# Patient Record
Sex: MALE | Race: BLACK OR AFRICAN AMERICAN | Employment: FULL TIME | URBAN - METROPOLITAN AREA
[De-identification: names, ages, dates, MRNs, and addresses within clinical notes are randomized per-mention and may not be internally consistent; named-entity substitution may affect disease eponyms.]

---

## 2018-10-11 ENCOUNTER — HOSPITAL ENCOUNTER (EMERGENCY)
Age: 48
Discharge: HOME | End: 2018-10-11
Attending: EMERGENCY MEDICINE

## 2018-10-11 ENCOUNTER — APPOINTMENT (OUTPATIENT)
Dept: GENERAL RADIOLOGY | Age: 48
End: 2018-10-11

## 2018-10-11 VITALS
HEART RATE: 64 BPM | OXYGEN SATURATION: 100 % | BODY MASS INDEX: 23.59 KG/M2 | RESPIRATION RATE: 16 BRPM | WEIGHT: 178 LBS | TEMPERATURE: 98.1 F | SYSTOLIC BLOOD PRESSURE: 129 MMHG | DIASTOLIC BLOOD PRESSURE: 80 MMHG | HEIGHT: 73 IN

## 2018-10-11 DIAGNOSIS — M25.561 ACUTE PAIN OF RIGHT KNEE: Primary | ICD-10-CM

## 2018-10-11 DIAGNOSIS — M25.461 KNEE EFFUSION, RIGHT: ICD-10-CM

## 2018-10-11 LAB
CRYSTALS, FLUID: NEGATIVE
SPECIMEN TYPE: NORMAL

## 2018-10-11 PROCEDURE — 87205 SMEAR GRAM STAIN: CPT

## 2018-10-11 PROCEDURE — 73562 X-RAY EXAM OF KNEE 3: CPT

## 2018-10-11 PROCEDURE — 99283 EMERGENCY DEPT VISIT LOW MDM: CPT

## 2018-10-11 PROCEDURE — 87075 CULTR BACTERIA EXCEPT BLOOD: CPT

## 2018-10-11 PROCEDURE — 87070 CULTURE OTHR SPECIMN AEROBIC: CPT

## 2018-10-11 PROCEDURE — 89051 BODY FLUID CELL COUNT: CPT

## 2018-10-11 PROCEDURE — 89060 EXAM SYNOVIAL FLUID CRYSTALS: CPT

## 2018-10-11 RX ORDER — IBUPROFEN 800 MG/1
800 TABLET ORAL EVERY 8 HOURS PRN
Qty: 15 TABLET | Refills: 0 | Status: SHIPPED | OUTPATIENT
Start: 2018-10-11 | End: 2022-06-16

## 2018-10-11 ASSESSMENT — PAIN DESCRIPTION - ORIENTATION: ORIENTATION: RIGHT

## 2018-10-11 ASSESSMENT — ENCOUNTER SYMPTOMS: COLOR CHANGE: 0

## 2018-10-11 ASSESSMENT — PAIN SCALES - GENERAL: PAINLEVEL_OUTOF10: 8

## 2018-10-11 ASSESSMENT — PAIN DESCRIPTION - PAIN TYPE: TYPE: ACUTE PAIN

## 2018-10-11 ASSESSMENT — PAIN DESCRIPTION - ONSET: ONSET: ON-GOING

## 2018-10-11 ASSESSMENT — PAIN DESCRIPTION - DESCRIPTORS: DESCRIPTORS: SHARP;STABBING

## 2018-10-11 ASSESSMENT — PAIN DESCRIPTION - LOCATION: LOCATION: KNEE

## 2018-10-11 ASSESSMENT — PAIN DESCRIPTION - PROGRESSION: CLINICAL_PROGRESSION: GRADUALLY WORSENING

## 2018-10-11 ASSESSMENT — PAIN DESCRIPTION - FREQUENCY: FREQUENCY: CONTINUOUS

## 2018-10-11 NOTE — LETTER
[facility] ED  [place] [address]  Phone: [phone]             October 11, 2018    Patient: Dagmar Baires   YOB: 1970   Date of Visit: 10/11/2018       To Whom It May Concern:    Dagmar Baires was seen and treated in our emergency department on 10/11/2018. 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     Sincerely,             Signature:__________________________________

## 2018-10-12 LAB
APPEARANCE FLUID: NORMAL
BASO FLUID: NORMAL %
COLOR FLUID: NORMAL
EOSINOPHIL FLUID: NORMAL %
FLUID DIFF COMMENT: NORMAL
LYMPHOCYTES, BODY FLUID: 49 %
MONOCYTE, FLUID: NORMAL %
NEUTROPHIL, FLUID: 23 %
OTHER CELLS FLUID: NORMAL %
RBC FLUID: 3000 /MM3
SPECIMEN TYPE: NORMAL
WBC FLUID: 59 /MM3

## 2018-10-13 LAB
CULTURE: ABNORMAL
DIRECT EXAM: ABNORMAL
Lab: ABNORMAL
SPECIMEN DESCRIPTION: ABNORMAL
STATUS: ABNORMAL

## 2018-10-15 ENCOUNTER — TELEPHONE (OUTPATIENT)
Dept: ORTHOPEDIC SURGERY | Age: 48
End: 2018-10-15

## 2022-06-16 ENCOUNTER — HOSPITAL ENCOUNTER (EMERGENCY)
Age: 52
Discharge: HOME | End: 2022-06-16
Attending: EMERGENCY MEDICINE
Payer: COMMERCIAL

## 2022-06-16 VITALS
SYSTOLIC BLOOD PRESSURE: 141 MMHG | HEART RATE: 69 BPM | OXYGEN SATURATION: 99 % | WEIGHT: 180 LBS | RESPIRATION RATE: 16 BRPM | HEIGHT: 73 IN | TEMPERATURE: 97.6 F | BODY MASS INDEX: 23.86 KG/M2 | DIASTOLIC BLOOD PRESSURE: 103 MMHG

## 2022-06-16 DIAGNOSIS — S61.213A LACERATION OF LEFT MIDDLE FINGER WITHOUT FOREIGN BODY WITHOUT DAMAGE TO NAIL, INITIAL ENCOUNTER: Primary | ICD-10-CM

## 2022-06-16 PROCEDURE — 90715 TDAP VACCINE 7 YRS/> IM: CPT | Performed by: PHYSICIAN ASSISTANT

## 2022-06-16 PROCEDURE — 99284 EMERGENCY DEPT VISIT MOD MDM: CPT

## 2022-06-16 PROCEDURE — 12002 RPR S/N/AX/GEN/TRNK2.6-7.5CM: CPT

## 2022-06-16 PROCEDURE — 6370000000 HC RX 637 (ALT 250 FOR IP): Performed by: PHYSICIAN ASSISTANT

## 2022-06-16 PROCEDURE — 90471 IMMUNIZATION ADMIN: CPT | Performed by: PHYSICIAN ASSISTANT

## 2022-06-16 PROCEDURE — 6360000002 HC RX W HCPCS: Performed by: PHYSICIAN ASSISTANT

## 2022-06-16 RX ORDER — GINSENG 100 MG
CAPSULE ORAL ONCE
Status: COMPLETED | OUTPATIENT
Start: 2022-06-16 | End: 2022-06-16

## 2022-06-16 RX ADMIN — TETANUS TOXOID, REDUCED DIPHTHERIA TOXOID AND ACELLULAR PERTUSSIS VACCINE, ADSORBED 0.5 ML: 5; 2.5; 8; 8; 2.5 SUSPENSION INTRAMUSCULAR at 14:55

## 2022-06-16 RX ADMIN — BACITRACIN: 500 OINTMENT TOPICAL at 14:56

## 2022-06-16 NOTE — ED PROVIDER NOTES
[id number] [facility] ED  [address] [facility]. [facility] [address]  Phone: [phone]  Fax: Idalia Morris [phone]      Pt Name: Jimena De  MRN: [id number]  [place] 1970  Date of evaluation: 6/16/22      CHIEF COMPLAINT:  Chief Complaint   Patient presents with    Hand Laceration       HISTORY OF PRESENT ILLNESS    Jimena De is a 46 y.o. male who presents for evaluation of a laceration:     Location/Symptom: Left long finger pad laceration  Timing/Onset: 4 hours ago  Context/Setting:   Patient here for evaluation of a work-related laceration of his left long finger pad. Patient was reportedly using a  and drilled across his finger accidentally. 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 No focal weakness/paresthesias. Bleeding controlled with pressure. Tetanus UTD? YES    Any broken fragments/shards of material(s) found near site of incident? NO    Quality: sharp, achy  Duration: constant  Modifying Factors: worse with movement  Severity: moderate    Nursing Notes were reviewed. REVIEW OF SYSTEMS       Constitutional: Denies recent fever, chills. HEENT: No visual changes. Neck: No neck pain. Respiratory: Denies recent shortness of breath. Cardiac:  Denies recent chest pain. GI:  Denies abdominal pain/nausea/vomiting/diarrhea. Musculoskeletal: Denies focal weakness. Neurologic: Denies headache or focal weakness. Skin:  laceration    Negative in 10 essential Systems except as mentioned above and in the HPI. PAST MEDICAL HISTORY   PMH:  has a past medical history of No active medical problems and Sebaceous cyst.  Surgical History:  has a past surgical history that includes Tonsillectomy and other surgical history (Right, 8/28/14).   Social History:  reports that he has been smoking cigarettes. He has never used smokeless tobacco. He reports current alcohol use. Family History: None  Psychiatric History: None    Allergies:has No Known Allergies. PHYSICAL EXAM     INITIAL VITALS: BP (!) 141/103   Pulse 69   Temp 97.6 °F (36.4 °C) (Oral)   Resp 16   Ht 6' 1\" (1.854 m)   Wt 81.6 kg (180 lb)   SpO2 99%   BMI 23.75 kg/m²   Constitutional:  Well developed   Eyes:  Pupils equal/round  HENT:  Atraumatic scalp/face, external ears normal, nose normal  Respiratory:  Clear to auscultation bilaterally with good air exchange, no W/R/R  Cardiovascular:  RRR with normal S1 and S2  Musculoskeletal:   Approximately 2.5-3 cm vertical laceration across the left long finger pad. There is no involvement of the DIP joint or the nail. Full range of motion's of this finger all joints, DIP/PIP and MCP. There is no focal weakness or any tenderness deficits appreciated. Wound is clean, no active bleeding or scant bleeding with removal of dressing. No ischemic changes. NV Intact distally  Integument:  As above  Neurologic:  Alert, appropriate interaction/mentation, no focal deficits noted       DIAGNOSTIC RESULTS     EKG: All EKG's are interpreted by the Emergency Department Physician who either signs or Co-signs this chart in the absence of a cardiologist.  Not indicated      RADIOLOGY:   Reviewed the radiologist:  No orders to display     Not indicated      LABS:  Labs Reviewed - No data to display  Not indicated    EMERGENCY DEPARTMENT COURSE:       Discussed risk of tendinous injury not found on physical exam and during my thorough cleaning/exploration of wound. Pt/family was told to get re-evaluated if they experience loss of motion of their injured finger/toe. This may be associated with a new injury/\"popping\" sound or sensation, or with normal daily activities/lifting. I have reviewed the disposition diagnosis with the patient and or their family/guardian.   I have discussed suture and wound care. I have answered their questions and given discharge instructions. They voiced understanding of these instructions and did not have any further questions or complaints. Laceration Repair Procedure Note    Indication: Laceration    Location:   Left long finger pad    Procedure: The patient was placed in the appropriate position and anesthesia around the laceration was obtained with a full digital block of the left long (middle) finger using 0.5% Bupivacaine without epinephrine. The area around wound(s) was then cleansed with betadine, draped in a sterile fashion and irrigated copiously with normal saline/betadine dilution. The wound(s) were explored well, no foreign bodies or tendon rupture/injury was found. The laceration was closed with 5-0 Prolene using interrupted sutures. There were no additional lacerations requiring repair. The wound area was then dressed with bacitracin and a sterile dressing. Total repaired wound length: 3 cm. Count: Suture count: 7    The patient tolerated the procedure well. Complications: None    Orders Placed This Encounter   Medications    Tetanus-Diphth-Acell Pertussis (BOOSTRIX) injection 0.5 mL    bacitracin ointment       CONSULTS:  None      FINAL IMPRESSION      1. Laceration of left middle finger without foreign body without damage to nail, initial encounter          DISPOSITION/PLAN:  DISPOSITION          PATIENT REFERRED TO:  [facility] ED  [address] [place] [address]  [phone]  Go in 8 days  For suture/staple removal go to [facility] office, Family MD or GUSTAVO Oakes MEDICATIONS:  New Prescriptions    No medications on file       (Please note that portions of this note were completed with a voice recognition program.  Efforts were made to edit the dictations but occasionally words are mis-transcribed.)    KAREN Quiroz PA-C  06/16/22 [id number]

## 2022-06-16 NOTE — ED PROVIDER NOTES
Emergency Department           I reviewed the mid level provider's note and agree with the documented findings and we have discussed the plan of care. I have reviewed the emergency nurses triage note. I agree with the chief complaint, past medical history, past surgical history, allergies, medications, social and family history as documented unless otherwise noted below.      Raheem Kang DO  06/16/22 [id number]